# Patient Record
Sex: FEMALE | Race: WHITE | Employment: STUDENT | URBAN - METROPOLITAN AREA
[De-identification: names, ages, dates, MRNs, and addresses within clinical notes are randomized per-mention and may not be internally consistent; named-entity substitution may affect disease eponyms.]

---

## 2021-07-06 ENCOUNTER — APPOINTMENT (OUTPATIENT)
Dept: GENERAL RADIOLOGY | Age: 21
End: 2021-07-06
Attending: NURSE PRACTITIONER
Payer: COMMERCIAL

## 2021-07-06 ENCOUNTER — HOSPITAL ENCOUNTER (OUTPATIENT)
Age: 21
Discharge: HOME | End: 2021-07-06
Payer: COMMERCIAL

## 2021-07-06 VITALS
DIASTOLIC BLOOD PRESSURE: 79 MMHG | TEMPERATURE: 98 F | RESPIRATION RATE: 18 BRPM | SYSTOLIC BLOOD PRESSURE: 125 MMHG | HEART RATE: 94 BPM | OXYGEN SATURATION: 100 %

## 2021-07-06 DIAGNOSIS — S49.90XA SHOULDER INJURY: Primary | ICD-10-CM

## 2021-07-06 DIAGNOSIS — S43.402A SPRAIN OF LEFT SHOULDER, UNSPECIFIED SHOULDER SPRAIN TYPE, INITIAL ENCOUNTER: ICD-10-CM

## 2021-07-06 PROCEDURE — 99203 OFFICE O/P NEW LOW 30 MIN: CPT | Performed by: NURSE PRACTITIONER

## 2021-07-06 PROCEDURE — A4565 SLINGS: HCPCS | Performed by: NURSE PRACTITIONER

## 2021-07-06 PROCEDURE — 73030 X-RAY EXAM OF SHOULDER: CPT | Performed by: NURSE PRACTITIONER

## 2021-07-07 NOTE — ED INITIAL ASSESSMENT (HPI)
Pt here s/p left shoulder dislocation on Saturday. [place] with [place] around body and when jumped in shoulder went up and dislocated. Denies numbness or tingling.  Positive radial pulse

## 2021-07-07 NOTE — ED PROVIDER NOTES
Patient Seen in: [facility]      History   Patient presents with:  Musculoskeletal Problem    Stated Complaint: LT SHOULDER INJURY    HPI/Subjective:   Well-appearing 55-year-old female presents with left shoulder soreness.  Patient has not t slurred speech. No oral lesions or pallor. Mucous membranes moist.     Neck:  Supple. Normal ROM. Lungs:  Good inspiratory effort. No accessory muscle use or tachypnea. Abdomen: Soft, nontender, non-distended.     Back: Normal inspection, no tendernes shoulder sprains and self-care for strains and sprains as well as over-the-counter/nonpharmacological treatment modalities were discussed with patient. Discussed follow-up with PMD and return/ED precautions.     Disposition and Plan     Clinical Impression: